# Patient Record
(demographics unavailable — no encounter records)

---

## 2024-11-06 NOTE — PHYSICAL EXAM
[No Edema] : there was no peripheral edema [Soft] : abdomen soft [Non-distended] : non-distended [No HSM] : no HSM [Normal Bowel Sounds] : normal bowel sounds [Normal] : no rash [Coordination Grossly Intact] : coordination grossly intact [No Focal Deficits] : no focal deficits [Normal Gait] : normal gait [Normal Affect] : the affect was normal [Normal Insight/Judgement] : insight and judgment were intact [de-identified] : tenderness in suprapubic region without rebound/guarding.

## 2024-11-06 NOTE — REVIEW OF SYSTEMS
[Fever] : fever [Chills] : chills [Abdominal Pain] : abdominal pain [Negative] : Heme/Lymph [Nausea] : no nausea [Constipation] : no constipation [Diarrhea] : no diarrhea [Vomiting] : no vomiting [Heartburn] : no heartburn [Melena] : no melena

## 2024-11-06 NOTE — HISTORY OF PRESENT ILLNESS
[FreeTextEntry8] : Pt reports pain/pressure in the bladder. Started Monday morning. Reports associated chills, feels he has had a fever. Wife thought he was running and fever and looked pale on Sunday but pt states he felt okay. Seen at  on Sunday, was told he does not have an infection in his urine but was prescribed Ciprofloxacin BID which he has been taking since Monday. Denies dysuria, hematuria, polyuria, difficulty urinating. Denies testicular pain/swelling, abnormal penile discharge, or concern for STDs. Denies prior history of nephrolithiasis or bladder infection. Last PSA low in 2021.

## 2024-11-08 NOTE — REVIEW OF SYSTEMS
[Fever] : no fever [Chills] : no chills [Night Sweats] : no night sweats [Fatigue] : no fatigue [Recent Change In Weight] : ~T no recent weight change [Vision Problems] : no vision problems [Dysphagia] : no dysphagia [Chest Pain] : no chest pain [Lower Ext Edema] : no lower extremity edema [Shortness Of Breath] : no shortness of breath [SOB on Exertion] : no shortness of breath during exertion [Abdominal Pain] : abdominal pain [Vomiting] : no vomiting [Constipation] : no constipation [Dysuria] : no dysuria [Joint Pain] : no joint pain [Skin Rash] : no skin rash [Confused] : no confusion [Dizziness] : no dizziness [Suicidal] : not suicidal [Anxiety] : no anxiety [Depression] : no depression [Easy Bleeding] : no tendency for easy bleeding [Easy Bruising] : no tendency for easy bruising [FreeTextEntry7] : lower abdominal discomfort

## 2024-11-08 NOTE — HISTORY OF PRESENT ILLNESS
[de-identified] : This is a 56 year old male who is here for an evaluation of lymphocytosis, referred by Dr. Lane Yost.   He has a history of beta thalassemia minor (baseline Hg around 12.5-14), GERD, PRASAD who was found to have a WBC of 117 on labs completed on 11/6. He was seen due to some bladder discomfort, no dysuria, no hematuria.    11/6/24-  Hg 12.4 Plt 194 N7/L90/M3   12/10/2021- WBC 36.3 Hg 14.0 Plt 251, ALC 30.0  He was hospitalized at [facility] in the end of 2018, at that time he had a perforated appendicitis, complicated by abscess.  His WBC at that time was ranging around 13-20,000, only lymphocyte predminant at the end of his hospitalization.   He denies any fever/chills, no night sweats, no weight loss.  Notes that he had COVID in 2020, post vaccination in late 2020 he developed a prolonged fatigue/long COVID symptoms that improved about 1 year later.

## 2024-11-08 NOTE — PHYSICAL EXAM
[Fully active, able to carry on all pre-disease performance without restriction] : Status 0 - Fully active, able to carry on all pre-disease performance without restriction [Normal] : affect appropriate [de-identified] : anicteric [de-identified] : small bilateral cervical nodes, posterior auricular nodes measuring about 1cm [de-identified] : CTA b/l [de-identified] : RRR, +S1/S2 [de-identified] : soft NT/ND, no splenomegaly appreciated [de-identified] : no palpable axillary nodes [de-identified] : normal [de-identified] : no rashes [de-identified] : nonfocal

## 2024-11-08 NOTE — ASSESSMENT
[FreeTextEntry1] : This is a 56 year old male with beta thalassemia minor with a progressive lymphocytosis. Suspect he likely has CLL, stage II disease.   Peripheral smear with  Labs sent today to confirm diagnosis.

## 2024-11-22 NOTE — HISTORY OF PRESENT ILLNESS
[FreeTextEntry1] : Tenzin is a 56-year-old man who is presenting for evaluation after an abnormal CT scan.  Tenzin had presented to the hospital because of lower abdominal pain which she felt was his bladder.  He was found to have significant elevation in white count and was subsequently diagnosed with CLL.  Further imaging revealed thickening of the sigmoid colon with fat stranding concerning for possible acute sigmoid diverticulitis.  Patient denies any current symptoms of left lower quadrant abdominal pain, fevers, chills.  He is fairly certain he has had a colonoscopy before years ago.

## 2024-11-22 NOTE — ASSESSMENT
[FreeTextEntry1] : 56-year-old male presenting for evaluation for colonoscopy after findings of sigmoid diverticulitis and colon wall thickening.   1.  Colon wall thickening suggestive of sigmoid diverticulitis, however patient without significant symptoms at the time of CT scan.  Does have recent cancer diagnosis of CLL.  -Will plan on diagnostic colonoscopy at least 8 weeks after CT scan to minimize risk of perforation during procedure.  -Depending on oncology workup consider alteration in workup.    I, oBzena Norman, have explained the bowel prep, clear liquid diet 24 hours prior to procedure, risks, benefits, and alternatives of the procedures.  I have discussed the potential risks including, but not limited to perforation, bleeding, infection, cardiopulmonary complications, aspiration, or unforeseen complications

## 2025-03-23 NOTE — CONSULT LETTER
[Dear  ___] : Dear  [unfilled], [Consult Letter:] : I had the pleasure of evaluating your patient, [unfilled]. [Please see my note below.] : Please see my note below. [Consult Closing:] : Thank you very much for allowing me to participate in the care of this patient.  If you have any questions, please do not hesitate to contact me. [Sincerely,] : Sincerely, [FreeTextEntry3] : Bharti Bunch D.O.  of Medicine Hematology/Oncology [facility]

## 2025-03-23 NOTE — PHYSICAL EXAM
[Fully active, able to carry on all pre-disease performance without restriction] : Status 0 - Fully active, able to carry on all pre-disease performance without restriction [Normal] : affect appropriate [de-identified] : anicteric [de-identified] : small bilateral cervical nodes, posterior auricular nodes measuring about 1cm [de-identified] : CTA b/l [de-identified] : RRR, +S1/S2 [de-identified] : soft NT/ND, no splenomegaly appreciated [de-identified] : no palpable axillary nodes [de-identified] : normal [de-identified] : right foot with 3cm ulcer, no discharge, slight surrounding erythema [de-identified] : nonfocal
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
## 2025-03-23 NOTE — ASSESSMENT
[FreeTextEntry1] : This is a 57 year old male with beta thalassemia minor with a progressive lymphocytosis. CLL, stage II disease.   11/8/24-  Hg 12.3 Plt 229  Peripheral blood flow cytometry 11/8/24- Monotypic B-cells (83% of total cells, 97% of lymphocytes), positive for lambda, CD19, CD20 (dim), CD23, CD5, ; negative CD10, CD11c, CD38, CD79b, FMC7. 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  CT chest/abd/pelvis - multiple enlarged mediastinal, bilateral hilar and axillary lymph nodes, the largest measuring 1.2cm in the right paratracheal region.  Spleen 14.8cm.  Multiple enlarged periportal, retroperitoneal, bilateral iliac chain nodes, largest measuring 1.6cm.  Occasional colonic diverticula with mild circumferencial thickening in the sigmoid area with pericolonic stranding concerning for diverticulitis.   Discussed indications for treatment- cytopenia (Hg <10, Plt <100), bulky lymphadenopathy (LN >10cm), massive splenomegaly (>6cm below the costal margin), severe constitutional symptoms.   Surveillance- H&P/labs every 3-6 months.    He was seen by GI for CT scan findings, concern for diverticulitis.   Colonoscopy was completed 2/24/25.   3/21/25- WBC up to 144, stable Hg (thal trait), plt possibly related to recent burn.  Wound care evaluation, course of keflex. CBC in 4 weeks.  Follow up in 3 months.

## 2025-03-23 NOTE — REVIEW OF SYSTEMS
[Fever] : no fever [Chills] : no chills [Night Sweats] : no night sweats [Fatigue] : fatigue [Recent Change In Weight] : ~T no recent weight change [Vision Problems] : no vision problems [Dysphagia] : no dysphagia [Chest Pain] : no chest pain [Lower Ext Edema] : no lower extremity edema [Shortness Of Breath] : no shortness of breath [SOB on Exertion] : no shortness of breath during exertion [Abdominal Pain] : abdominal pain [Vomiting] : no vomiting [Constipation] : no constipation [Dysuria] : no dysuria [Joint Pain] : no joint pain [Skin Rash] : no skin rash [Confused] : no confusion [Dizziness] : no dizziness [Suicidal] : not suicidal [Anxiety] : no anxiety [Depression] : no depression [Easy Bleeding] : no tendency for easy bleeding [Easy Bruising] : no tendency for easy bruising [FreeTextEntry7] : lower abdominal discomfort

## 2025-06-20 NOTE — CONSULT LETTER
[Dear  ___] : Dear  [unfilled], [Consult Letter:] : I had the pleasure of evaluating your patient, [unfilled]. [Please see my note below.] : Please see my note below. [Consult Closing:] : Thank you very much for allowing me to participate in the care of this patient.  If you have any questions, please do not hesitate to contact me. [Sincerely,] : Sincerely, [FreeTextEntry3] : Bharti Bunch D.O.  of Medicine Hematology/Oncology [facility]
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
## 2025-06-20 NOTE — REVIEW OF SYSTEMS
[Fatigue] : fatigue [Abdominal Pain] : abdominal pain [Fever] : no fever [Chills] : no chills [Night Sweats] : no night sweats [Recent Change In Weight] : ~T no recent weight change [Vision Problems] : no vision problems [Dysphagia] : no dysphagia [Chest Pain] : no chest pain [Lower Ext Edema] : no lower extremity edema [Shortness Of Breath] : no shortness of breath [SOB on Exertion] : no shortness of breath during exertion [Vomiting] : no vomiting [Constipation] : no constipation [Dysuria] : no dysuria [Joint Pain] : no joint pain [Skin Rash] : no skin rash [Confused] : no confusion [Dizziness] : no dizziness [Suicidal] : not suicidal [Anxiety] : no anxiety [Depression] : no depression [Easy Bleeding] : no tendency for easy bleeding [Easy Bruising] : no tendency for easy bruising [FreeTextEntry7] : lower abdominal discomfort